# Patient Record
Sex: FEMALE | Race: BLACK OR AFRICAN AMERICAN | NOT HISPANIC OR LATINO | URBAN - NONMETROPOLITAN AREA
[De-identification: names, ages, dates, MRNs, and addresses within clinical notes are randomized per-mention and may not be internally consistent; named-entity substitution may affect disease eponyms.]

---

## 2020-06-18 ENCOUNTER — IMPORTED ENCOUNTER (OUTPATIENT)
Dept: URBAN - NONMETROPOLITAN AREA CLINIC 1 | Facility: CLINIC | Age: 30
End: 2020-06-18

## 2020-06-18 PROBLEM — E10.3293: Noted: 2020-06-18

## 2020-06-18 PROBLEM — H52.13: Noted: 2020-06-18

## 2020-06-18 PROBLEM — E10.3393: Noted: 2020-06-18

## 2020-06-18 PROCEDURE — S0620 ROUTINE OPHTHALMOLOGICAL EXA: HCPCS

## 2020-06-18 NOTE — PATIENT DISCUSSION
DM s W/  OU: -Stressed the importance of keeping blood sugars under control and regular visits with PCP. -Explained the possible effects of poorly controlled diabetes and the damage that diabetes can cause to ocular health. -Pt instructed to contact our office with any vision changes.-Order OCT MAC per WB no charge patient does not have ins but he wants a baseline. **Premature retinopathyMyopia-Discussed diagnosis with patient. -Explained that people who are myopic are at a higher risk for developing RD/RT and reviewed associated S&S.-Pt to contact our office if symptoms develop.

## 2022-04-09 ASSESSMENT — VISUAL ACUITY
OS_SC: 20/50
OD_SC: 20/30

## 2022-04-09 ASSESSMENT — TONOMETRY
OS_IOP_MMHG: 16
OD_IOP_MMHG: 17

## 2022-08-30 ENCOUNTER — ESTABLISHED PATIENT (OUTPATIENT)
Dept: RURAL CLINIC 2 | Facility: CLINIC | Age: 32
End: 2022-08-30

## 2022-08-30 DIAGNOSIS — H52.13: ICD-10-CM

## 2022-08-30 DIAGNOSIS — H52.223: ICD-10-CM

## 2022-08-30 DIAGNOSIS — E10.3393: ICD-10-CM

## 2022-08-30 DIAGNOSIS — H25.813: ICD-10-CM

## 2022-08-30 PROCEDURE — S0621 ROUTINE OPHTHALMOLOGICAL EXA: HCPCS

## 2022-08-30 ASSESSMENT — VISUAL ACUITY
OD_CC: 20/30-1
OS_CC: 20/30

## 2022-08-30 ASSESSMENT — TONOMETRY
OD_IOP_MMHG: 15
OS_IOP_MMHG: 15